# Patient Record
Sex: FEMALE | Race: WHITE | URBAN - METROPOLITAN AREA
[De-identification: names, ages, dates, MRNs, and addresses within clinical notes are randomized per-mention and may not be internally consistent; named-entity substitution may affect disease eponyms.]

---

## 2021-03-15 DIAGNOSIS — Z23 NEED FOR VACCINATION: ICD-10-CM

## 2024-06-19 ENCOUNTER — OFFICE VISIT (OUTPATIENT)
Dept: DERMATOLOGY CLINIC | Facility: CLINIC | Age: 69
End: 2024-06-19

## 2024-06-19 DIAGNOSIS — D23.9 BENIGN NEOPLASM OF SKIN, UNSPECIFIED LOCATION: ICD-10-CM

## 2024-06-19 DIAGNOSIS — L82.1 SK (SEBORRHEIC KERATOSIS): ICD-10-CM

## 2024-06-19 DIAGNOSIS — L57.0 AK (ACTINIC KERATOSIS): ICD-10-CM

## 2024-06-19 DIAGNOSIS — D48.5 NEOPLASM OF UNCERTAIN BEHAVIOR OF SKIN: Primary | ICD-10-CM

## 2024-06-19 DIAGNOSIS — D22.9 MULTIPLE NEVI: ICD-10-CM

## 2024-06-19 PROCEDURE — 99203 OFFICE O/P NEW LOW 30 MIN: CPT | Performed by: DERMATOLOGY

## 2024-06-19 PROCEDURE — 17000 DESTRUCT PREMALG LESION: CPT | Performed by: DERMATOLOGY

## 2024-06-19 PROCEDURE — 11102 TANGNTL BX SKIN SINGLE LES: CPT | Performed by: DERMATOLOGY

## 2024-06-19 PROCEDURE — 17003 DESTRUCT PREMALG LES 2-14: CPT | Performed by: DERMATOLOGY

## 2024-06-19 PROCEDURE — 88305 TISSUE EXAM BY PATHOLOGIST: CPT | Performed by: DERMATOLOGY

## 2024-06-19 RX ORDER — BUSPIRONE HYDROCHLORIDE 7.5 MG/1
7.5 TABLET ORAL 3 TIMES DAILY
COMMUNITY
Start: 2024-06-03

## 2024-06-19 RX ORDER — SIMVASTATIN 20 MG
TABLET ORAL
COMMUNITY
Start: 2011-05-17

## 2024-06-19 RX ORDER — ESCITALOPRAM OXALATE 10 MG/1
10 TABLET ORAL DAILY
COMMUNITY

## 2024-06-19 RX ORDER — SERTRALINE HYDROCHLORIDE 100 MG/1
TABLET, FILM COATED ORAL
COMMUNITY
Start: 2010-09-22

## 2024-06-19 RX ORDER — DEXTROAMPHETAMINE SACCHARATE, AMPHETAMINE ASPARTATE, DEXTROAMPHETAMINE SULFATE AND AMPHETAMINE SULFATE 1.25; 1.25; 1.25; 1.25 MG/1; MG/1; MG/1; MG/1
1 TABLET ORAL EVERY MORNING
COMMUNITY
Start: 2024-06-03

## 2024-06-19 RX ORDER — LAMOTRIGINE 200 MG/1
TABLET ORAL
COMMUNITY
Start: 2010-09-22

## 2024-06-24 NOTE — PROGRESS NOTES
Operative Report                     Shave/  Tangential biopsy     Clinical diagnosis:    Size of lesion:    Location:Patient with 1cm pearly papule left upper chest r/o BCC     Procedure:    With patient in appropriate position the skin of the above was scrubbed with alcohol.  Anesthesia was obtained with 1% Xylocaine with epinephrine.  The skin surrounding the lesion was placed under tension and the lesion was incised using a #15 scalpel blade.  The specimen was sent for histopathologic exam.    Hemostasis was obtained with electrocautery/aluminum chloride.  Estimated blood loss less than 2 cc.    Biopsy dressed with Polysporin, bandage.    Pressure dressing:   No    Complications: None    Written instructions given and reviewed with patient    Await pathology    Contact information reviewed.    Procedural physician:  Jewels Hyman MD

## 2024-06-24 NOTE — PROGRESS NOTES
Lissette Kathleen is a 68 year old female.  HPI:     CC:    Chief Complaint   Patient presents with    Full Skin Exam     NO hx of skin ca.  New pt presenting for FBSE.   Lesion of concern to the face and back, denies bleeding or pain.            HISTORY:    Past Medical History:    Depression    Ear infection    Headache    Knee problem    per NG: right knee issues    Lipid screening    Ovarian cyst    Tinnitus      Past Surgical History:   Procedure Laterality Date    Colonoscopy  09/20/2010    Colonoscopy N/A 05/27/2021    Procedure: COLONOSCOPY, POSSIBLE BIOPSY, POSSIBLE POLYPECTOMY 57275;  Surgeon: Amauri Malik MD;  Location: [facility] [place], [facility]    Knee arthroscopy  01/01/2008    Oophorectomy  01/01/1999    right salpingo-oophorectomy      Family History   Problem Relation Age of Onset    Cancer Father         lung    Lipids Father         hyperlipidemia    Heart Disease Father 50        premature CAD    Cancer Mother         stomach      Social History     Socioeconomic History    Marital status:    Tobacco Use    Smoking status: Never   Substance and Sexual Activity    Alcohol use: Yes   Other Topics Concern    Caffeine Concern Yes     Comment: coffee    Grew up on a farm No    History of tanning Yes    Outdoor occupation No    Breast feeding No    Reaction to local anesthetic No    Pt has a pacemaker No    Pt has a defibrillator No     Social Determinants of Health      Received from [facility]    Social Connections    Received from [facility]    Housing Stability        Current Outpatient Medications   Medication Sig Dispense Refill    simvastatin (ZOCOR) 20 MG Oral Tab Take by mouth.      sertraline 100 MG Oral Tab Take by mouth.      lamoTRIgine 200 MG Oral Tab Take by mouth.      Deplin 15 MG Oral Tab Take by mouth.      escitalopram 10 MG Oral Tab Take 1 tablet (10 mg total) by mouth daily.      Calcium Carbonate-Vitamin D (CALTRATE 600+D) 600-10  MG-MCG Oral Tab Take by mouth.      busPIRone HCl 7.5 MG Oral Tab Take 1 tablet (7.5 mg total) by mouth 3 (three) times daily.      amphetamine-dextroamphetamine 5 MG Oral Tab Take 1 tablet by mouth every morning.      clomiPRAMINE 50 MG Oral Cap Take 2 capsules (100 mg total) by mouth nightly. TAKE AT BEDTIME      clonazePAM 0.5 MG Oral Tab Take 1 tablet (0.5 mg total) by mouth 3 (three) times daily as needed.      fluvoxaMINE 100 MG Oral Tab Take 1 tablet (100 mg total) by mouth daily.      atorvastatin 20 MG Oral Tab TAKE 1 TABLET BY MOUTH DAILY       Allergies:   Allergies   Allergen Reactions    Seasonal OTHER (SEE COMMENTS)     Runny nose       Past Medical History:    Depression    Ear infection    Headache    Knee problem    per NG: right knee issues    Lipid screening    Ovarian cyst    Tinnitus     Past Surgical History:   Procedure Laterality Date    Colonoscopy  09/20/2010    Colonoscopy N/A 05/27/2021    Procedure: COLONOSCOPY, POSSIBLE BIOPSY, POSSIBLE POLYPECTOMY 94884;  Surgeon: Amauri Malik MD;  [facility]    Knee arthroscopy  01/01/2008    Oophorectomy  01/01/1999    right salpingo-oophorectomy     Social History     Socioeconomic History    Marital status:      Spouse name: Not on file    Number of children: Not on file    Years of education: Not on file    Highest education level: Not on file   Occupational History    Not on file   Tobacco Use    Smoking status: Never    Smokeless tobacco: Not on file   Substance and Sexual Activity    Alcohol use: Yes    Drug use: Not on file    Sexual activity: Not on file   Other Topics Concern     Service Not Asked    Blood Transfusions Not Asked    Caffeine Concern Yes     Comment: coffee    Occupational Exposure Not Asked    Hobby Hazards Not Asked    Sleep Concern Not Asked    Stress Concern Not Asked    Weight Concern Not Asked    Special Diet Not Asked    Back Care Not Asked    Exercise Not Asked    Bike Helmet  Not Asked    Seat Belt Not Asked    Self-Exams Not Asked    Grew up on a farm No    History of tanning Yes    Outdoor occupation No    Breast feeding No    Reaction to local anesthetic No    Pt has a pacemaker No    Pt has a defibrillator No   Social History Narrative    Not on file     Social Determinants of Health     Financial Resource Strain: Not on file   Food Insecurity: Not on file   Transportation Needs: Not on file   Physical Activity: Not on file   Stress: Not on file   Social Connections: Unknown (3/9/2021)    Received from [facility]    Social Connections     Conversations with friends/family/neighbors per week: Not on file   Housing Stability: Low Risk  (4/21/2024)    Received from [facility]    Housing Stability     Mortgage Payment Concerns?: Not on file     Number of Places Lived in the Last Year: Not on file     Unstable Housing?: Not on file     Family History   Problem Relation Age of Onset    Cancer Father         lung    Lipids Father         hyperlipidemia    Heart Disease Father 50        premature CAD    Cancer Mother         stomach       There were no vitals filed for this visit.    HPI:  Chief Complaint   Patient presents with    Full Skin Exam     NO hx of skin ca.  New pt presenting for FBSE.   Lesion of concern to the face and back, denies bleeding or pain.      New patient.  Concern with several lesions    Patient presents with concerns above.    Patient has been in their usual state of health.     Past notes/ records and appropriate/relevant lab results including pathology and past body maps reviewed. Including outside notes/ PCP notes as appropriate. Updated and new information noted in current visit.     ROS:  Denies other relevant systemic complaints.      History, medications, allergies reviewed as noted.    Allergies:  Seasonal     Physical Examination:     Well-developed well-nourished patient alert oriented in no acute distress.  Exam  performed, including scalp, head, neck, face,nails, hair, external eyes, including conjunctival mucosa, eyelids, lips external ears , arms, digits,palms.     Multiple light to medium brown, well marginated, uniformly pigmented, macules and papules 6 mm and less scattered on exam. pigmented lesions examined with dermoscopy benign-appearing patterns.     Waxy tannish keratotic papules scattered, cherry-red vascular papules scattered.    See map today's date for lesions noted .  See assessment and plan below for specific lesions.    Otherwise remarkable for lesions as noted on map.    See A/P  below for additional information:    Assessment / plan:    Orders Placed This Encounter   Procedures    Specimen to Pathology, Tissue [[facility] Pt to [facility]]       Meds & Refills for this Visit:  Requested Prescriptions      No prescriptions requested or ordered in this encounter         Encounter Diagnoses   Name Primary?    Neoplasm of uncertain behavior of skin Yes    AK (actinic keratosis)     SK (seborrheic keratosis)     Benign neoplasm of skin, unspecified location     Multiple nevi          Patient with 1cm pearly papule left upper chest r/o BCC   Shave/ tangential biopsy performed, operative note and consent in chart further plans pending pathology    Erythematous scaling keratotic papules noted at sites noted on map  Actinic Keratoses.  Precancerous nature discussed. Sun protection, sunscreen/ blocks encouraged Lesions treated with cryo- .  Biopsy if not resolved.    X3    Benign-appearing nevi, no atypical features on dermoscopy reassurance given monitor.     Waxy tan keratotic papules lesions in areas of concern as noted reassurance given.  Benign nature discussed.  Possibility of cryo, alphahydroxy acids over-the-counter retinol's discussed.     No other susupicious lesions on todays  exam.    Please refer to map for specific lesions.  See additional diagnoses.  Pros cons of various therapies, risks benefits  discussed.Pathophysiology discussed with patient.  Therapeutic options reviewed.  See  Medications in grid.  Instructions reviewed at length.    Benign nevi, seborrheic  keratoses, cherry angiomas:  Reassurance regarding other benign skin lesions.Signs and symptoms of skin cancer, ABCDE's of melanoma discussed with patient. Sunscreen (broad-spectrum, ideally mineral-based-UVA/UVB -SPF 30 or higher) use encouraged, sun protection/sun protective clothing, self exams reviewed Followup as noted RTC ---routine checkup    6 mos -one year or p.r.n.    Encounter Times   Including precharting, reviewing chart, prior notes obtaining history: 10 minutes, medical exam :10 minutes, notes on body map, plan, counseling 10minutes My total time spent caring for the patient on the day of the encounter: 30 minutes     The patient indicates understanding of these issues and agrees to the plan.  The patient is asked to return as noted in follow-up/ above.    This note was generated using Dragon voice recognition software.  Please contact me regarding any confusion resulting from errors in recognition..  Note to patient and family: The 21st Century Cures Act makes medical notes like these available to patients. However, be advised this is a medical document. It is intended as [id number] communication and monitoring of a patient's care needs. It is written in medical language and may contain abbreviations or verbiage that are unfamiliar. It may appear blunt or direct. Medical documents are intended to carry relevant information, facts as evident and the clinical opinion of the practitioner.

## 2024-11-11 ENCOUNTER — OFFICE VISIT (OUTPATIENT)
Dept: DERMATOLOGY CLINIC | Facility: CLINIC | Age: 69
End: 2024-11-11

## 2024-11-11 DIAGNOSIS — Z08 ENCOUNTER FOR FOLLOW-UP SURVEILLANCE OF SKIN CANCER: ICD-10-CM

## 2024-11-11 DIAGNOSIS — Z85.828 ENCOUNTER FOR FOLLOW-UP SURVEILLANCE OF SKIN CANCER: ICD-10-CM

## 2024-11-11 DIAGNOSIS — D22.9 MULTIPLE NEVI: ICD-10-CM

## 2024-11-11 DIAGNOSIS — D23.9 BENIGN NEOPLASM OF SKIN, UNSPECIFIED LOCATION: ICD-10-CM

## 2024-11-11 DIAGNOSIS — L57.0 AK (ACTINIC KERATOSIS): Primary | ICD-10-CM

## 2024-11-11 DIAGNOSIS — L82.1 SK (SEBORRHEIC KERATOSIS): ICD-10-CM

## 2024-11-11 PROCEDURE — 99213 OFFICE O/P EST LOW 20 MIN: CPT | Performed by: DERMATOLOGY

## 2024-11-24 NOTE — PROGRESS NOTES
Lissette Kathleen is a 69 year old female.  HPI:     CC:    Chief Complaint   Patient presents with    Basal Cell Carcinoma     LOV 6/19/24. Pt presents for 4 month f/u regarding BCC on L upper chest. Denies any concerns at this time.            HISTORY:    Past Medical History:    BCC (basal cell carcinoma of skin)    Skin, left upper chest, shave biopsy:    Depression    Ear infection    Headache    Knee problem    per NG: right knee issues    Lipid screening    Ovarian cyst    Tinnitus      Past Surgical History:   Procedure Laterality Date    Colonoscopy  09/20/2010    Colonoscopy N/A 05/27/2021    Procedure: COLONOSCOPY, POSSIBLE BIOPSY, POSSIBLE POLYPECTOMY 33758;  Surgeon: Amauri Malik MD;  Location: [facility]    Knee arthroscopy  01/01/2008    Oophorectomy  01/01/1999    right salpingo-oophorectomy      Family History   Problem Relation Age of Onset    Cancer Father         lung    Lipids Father         hyperlipidemia    Heart Disease Father 50        premature CAD    Cancer Mother         stomach      Social History     Socioeconomic History    Marital status:    Tobacco Use    Smoking status: Never   Substance and Sexual Activity    Alcohol use: Yes   Other Topics Concern    Caffeine Concern Yes     Comment: coffee    Grew up on a farm No    History of tanning Yes    Outdoor occupation No    Breast feeding No    Reaction to local anesthetic No    Pt has a pacemaker No    Pt has a defibrillator No     Social Drivers of Health      Received from [facility]    Social Connections    Received from [facility]    Housing Stability        Current Outpatient Medications   Medication Sig Dispense Refill    simvastatin (ZOCOR) 20 MG Oral Tab Take by mouth.      sertraline 100 MG Oral Tab Take by mouth.      lamoTRIgine 200 MG Oral Tab Take by mouth.      Deplin 15 MG Oral Tab Take by mouth.      escitalopram 10 MG Oral Tab Take 1 tablet (10 mg total) by  mouth daily.      Calcium Carbonate-Vitamin D (CALTRATE 600+D) 600-10 MG-MCG Oral Tab Take by mouth.      busPIRone HCl 7.5 MG Oral Tab Take 1 tablet (7.5 mg total) by mouth 3 (three) times daily.      amphetamine-dextroamphetamine 5 MG Oral Tab Take 1 tablet by mouth every morning.      clomiPRAMINE 50 MG Oral Cap Take 2 capsules (100 mg total) by mouth nightly. TAKE AT BEDTIME      clonazePAM 0.5 MG Oral Tab Take 1 tablet (0.5 mg total) by mouth 3 (three) times daily as needed.      fluvoxaMINE 100 MG Oral Tab Take 1 tablet (100 mg total) by mouth daily.      atorvastatin 20 MG Oral Tab TAKE 1 TABLET BY MOUTH DAILY      Imiquimod 5 % External Cream Apply topically 2 times every week to affected area on chest x 6 weeks (Patient not taking: Reported on 11/11/2024) 12 each 0     Allergies:   Allergies   Allergen Reactions    Seasonal OTHER (SEE COMMENTS)     Runny nose       Past Medical History:    BCC (basal cell carcinoma of skin)    Skin, left upper chest, shave biopsy:    Depression    Ear infection    Headache    Knee problem    per NG: right knee issues    Lipid screening    Ovarian cyst    Tinnitus     Past Surgical History:   Procedure Laterality Date    Colonoscopy  09/20/2010    Colonoscopy N/A 05/27/2021    Procedure: COLONOSCOPY, POSSIBLE BIOPSY, POSSIBLE POLYPECTOMY 58095;  Surgeon: Amauri Malik MD;  [facility]    Knee arthroscopy  01/01/2008    Oophorectomy  01/01/1999    right salpingo-oophorectomy     Social History     Socioeconomic History    Marital status:      Spouse name: Not on file    Number of children: Not on file    Years of education: Not on file    Highest education level: Not on file   Occupational History    Not on file   Tobacco Use    Smoking status: Never    Smokeless tobacco: Not on file   Substance and Sexual Activity    Alcohol use: Yes    Drug use: Not on file    Sexual activity: Not on file   Other Topics Concern     Service Not Asked     Blood Transfusions Not Asked    Caffeine Concern Yes     Comment: coffee    Occupational Exposure Not Asked    Hobby Hazards Not Asked    Sleep Concern Not Asked    Stress Concern Not Asked    Weight Concern Not Asked    Special Diet Not Asked    Back Care Not Asked    Exercise Not Asked    Bike Helmet Not Asked    Seat Belt Not Asked    Self-Exams Not Asked    Grew up on a farm No    History of tanning Yes    Outdoor occupation No    Breast feeding No    Reaction to local anesthetic No    Pt has a pacemaker No    Pt has a defibrillator No   Social History Narrative    Not on file     Social Drivers of Health     Financial Resource Strain: Not on file   Food Insecurity: Not on file   Transportation Needs: Not on file   Physical Activity: Not on file   Stress: Not on file   Social Connections: Unknown (3/9/2021)    Received from [facility]    Social Connections     Conversations with friends/family/neighbors per week: Not on file   Housing Stability: Low Risk  (4/21/2024)    Received from [facility]    Housing Stability     Mortgage Payment Concerns?: Not on file     Number of Places Lived in the Last Year: Not on file     Unstable Housing?: Not on file     Family History   Problem Relation Age of Onset    Cancer Father         lung    Lipids Father         hyperlipidemia    Heart Disease Father 50        premature CAD    Cancer Mother         stomach       There were no vitals filed for this visit.    HPI:  Chief Complaint   Patient presents with    Basal Cell Carcinoma     LOV 6/19/24. Pt presents for 4 month f/u regarding BCC on L upper chest. Denies any concerns at this time.      Follow-up AK's, history of BCC left upper chest  Patient presents with concerns above.    Patient has been in their usual state of health.     Past notes/ records and appropriate/relevant lab results including pathology and past body maps reviewed. Including outside notes/ PCP notes as  appropriate. Updated and new information noted in current visit.     ROS:  Denies other relevant systemic complaints.      History, medications, allergies reviewed as noted.    Allergies:  Seasonal     Physical Examination:     Well-developed well-nourished patient alert oriented in no acute distress.  Exam performed, including scalp, head, neck, face,nails, hair, external eyes, including conjunctival mucosa, eyelids, lips external ears , arms, digits,palms.     Multiple light to medium brown, well marginated, uniformly pigmented, macules and papules 6 mm and less scattered on exam. pigmented lesions examined with dermoscopy benign-appearing patterns.     Waxy tannish keratotic papules scattered, cherry-red vascular papules scattered.    See map today's date for lesions noted .  See assessment and plan below for specific lesions.    Otherwise remarkable for lesions as noted on map.    See A/P  below for additional information:    Assessment / plan:    No orders of the defined types were placed in this encounter.      Meds & Refills for this Visit:  Requested Prescriptions      No prescriptions requested or ordered in this encounter         Encounter Diagnoses   Name Primary?    AK (actinic keratosis) Yes    SK (seborrheic keratosis)     Benign neoplasm of skin, unspecified location     Multiple nevi     Encounter for follow-up surveillance of skin cancer        6/24 BCC left upper chest toward shoulder post biopsy cautery no evidence of persistent, recurrent lesion.  Healed well.  Continue monitoring  Patient with history of skin cancer.  No evidence of recurrence.    No new skin cancer.    History of AK's no new actinic keratoses  Actinic Keratoses.  Precancerous nature discussed. Sun protection, sunscreen/ blocks encouraged .  Monitoring for new lesions.  Sun damage additional recurrent and new actinic keratoses, skin cancers may occur in areas of prior actinic keratoses, related to past sun exposure to minimize  current sun exposure.  Sunscreen applied consistently regularly, reapplication and sun protection while driving recommended.  Continue sunscreen sun protection regular follow-up    Benign-appearing nevi, no atypical features on dermoscopy reassurance given monitor.     Waxy tan keratotic papules lesions in areas of concern as noted reassurance given.  Benign nature discussed.  Possibility of cryo, alphahydroxy acids over-the-counter retinol's discussed.     No other susupicious lesions on todays  exam.    Please refer to map for specific lesions.  See additional diagnoses.  Pros cons of various therapies, risks benefits discussed.Pathophysiology discussed with patient.  Therapeutic options reviewed.  See  Medications in grid.  Instructions reviewed at length.    Benign nevi, seborrheic  keratoses, cherry angiomas:  Reassurance regarding other benign skin lesions.Signs and symptoms of skin cancer, ABCDE's of melanoma discussed with patient. Sunscreen (broad-spectrum, ideally mineral-based-UVA/UVB -SPF 30 or higher) use encouraged, sun protection/sun protective clothing, self exams reviewed Followup as noted RTC ---routine checkup    6 mos -one year or p.r.n.    Encounter Times   Including precharting, reviewing chart, prior notes obtaining history: 10 minutes, medical exam :10 minutes, notes on body map, plan, counseling 10minutes My total time spent caring for the patient on the day of the encounter: 30 minutes     The patient indicates understanding of these issues and agrees to the plan.  The patient is asked to return as noted in follow-up/ above.    This note was generated using Dragon voice recognition software.  Please contact me regarding any confusion resulting from errors in recognition..  Note to patient and family: The 21st Century Cures Act makes medical notes like these available to patients. However, be advised this is a medical document. It is intended as [id number] communication and monitoring of a  patient's care needs. It is written in medical language and may contain abbreviations or verbiage that are unfamiliar. It may appear blunt or direct. Medical documents are intended to carry relevant information, facts as evident and the clinical opinion of the practitioner.